# Patient Record
Sex: MALE | Race: WHITE | NOT HISPANIC OR LATINO | Employment: PART TIME | URBAN - METROPOLITAN AREA
[De-identification: names, ages, dates, MRNs, and addresses within clinical notes are randomized per-mention and may not be internally consistent; named-entity substitution may affect disease eponyms.]

---

## 2024-06-09 ENCOUNTER — HOSPITAL ENCOUNTER (EMERGENCY)
Facility: HOSPITAL | Age: 39
Discharge: HOME | End: 2024-06-10
Attending: FAMILY MEDICINE
Payer: COMMERCIAL

## 2024-06-09 DIAGNOSIS — T15.02XA FOREIGN BODY OF LEFT CORNEA, INITIAL ENCOUNTER: Primary | ICD-10-CM

## 2024-06-09 PROCEDURE — 65220 REMOVE FOREIGN BODY FROM EYE: CPT | Mod: LT

## 2024-06-09 PROCEDURE — 99284 EMERGENCY DEPT VISIT MOD MDM: CPT | Mod: 25

## 2024-06-09 PROCEDURE — 25000003 PHARM REV CODE 250: Performed by: FAMILY MEDICINE

## 2024-06-09 RX ORDER — PROPARACAINE HYDROCHLORIDE 5 MG/ML
1 SOLUTION/ DROPS OPHTHALMIC
Status: COMPLETED | OUTPATIENT
Start: 2024-06-09 | End: 2024-06-09

## 2024-06-09 RX ADMIN — PROPARACAINE HYDROCHLORIDE 1 DROP: 5 SOLUTION/ DROPS OPHTHALMIC at 11:06

## 2024-06-10 VITALS
DIASTOLIC BLOOD PRESSURE: 88 MMHG | SYSTOLIC BLOOD PRESSURE: 137 MMHG | BODY MASS INDEX: 31.48 KG/M2 | HEIGHT: 71 IN | HEART RATE: 74 BPM | OXYGEN SATURATION: 99 % | TEMPERATURE: 98 F | WEIGHT: 224.88 LBS | RESPIRATION RATE: 18 BRPM

## 2024-06-10 RX ORDER — MOXIFLOXACIN 5 MG/ML
1 SOLUTION/ DROPS OPHTHALMIC 4 TIMES DAILY
Qty: 3 ML | Refills: 0 | Status: SHIPPED | OUTPATIENT
Start: 2024-06-10 | End: 2024-06-17

## 2024-06-10 RX ORDER — ERYTHROMYCIN 5 MG/G
OINTMENT OPHTHALMIC
Qty: 3.5 G | Refills: 0 | Status: SHIPPED | OUTPATIENT
Start: 2024-06-10

## 2024-06-10 NOTE — ED PROVIDER NOTES
Encounter Date: 6/9/2024       History     Chief Complaint   Patient presents with    Foreign Body in Eye     Object in left eye secondary to using  (without eye protection) on yesterday. Was initially seen at [facility] and was told that the object is visible. Patient also states he received tetracaine drops and denies pain at this time. SholaYue ramoshannah     Patient was a 39-year-old gentleman sent over from the [facility] urgent care clinic for evaluation due to foreign body in the left eye.  Patient reports that he was using a  today, and took off his glasses just for a short period of time, and subsequently got a piece of metal in his eye.  Reports mild irritation when tetracaine wears off, but currently in no distress.  Denies any vision changes.    The history is provided by the patient.     Review of patient's allergies indicates:  No Known Allergies  History reviewed. No pertinent past medical history.  History reviewed. No pertinent surgical history.  No family history on file.     Review of Systems   Constitutional:  Negative for chills, fatigue and fever.   HENT:  Negative for ear pain, rhinorrhea and sore throat.    Eyes:  Negative for photophobia and pain.   Respiratory:  Negative for cough, shortness of breath and wheezing.    Cardiovascular:  Negative for chest pain.   Gastrointestinal:  Negative for abdominal pain, diarrhea, nausea and vomiting.   Genitourinary:  Negative for dysuria.   Neurological:  Negative for dizziness, weakness and headaches.   All other systems reviewed and are negative.      Physical Exam     Initial Vitals [06/09/24 2124]   BP Pulse Resp Temp SpO2   (!) 163/78 77 18 97.9 °F (36.6 °C) 99 %      MAP       --         Physical Exam    Nursing note and vitals reviewed.  Constitutional: He appears well-developed and well-nourished.   HENT:   Head: Normocephalic and atraumatic.   Mouth/Throat: Oropharynx is clear and moist.   Eyes: EOM are normal. Pupils are  equal, round, and reactive to light.   Patient was a small piece of metal of the 3 o'clock position, no surrounding erythema   Neck: Neck supple.   Normal range of motion.  Cardiovascular:  Normal rate, regular rhythm, normal heart sounds and intact distal pulses.     Exam reveals no gallop and no friction rub.       No murmur heard.  Pulmonary/Chest: Breath sounds normal. No respiratory distress.   Abdominal: Abdomen is soft. Bowel sounds are normal. He exhibits no distension. There is no abdominal tenderness.   Musculoskeletal:         General: Normal range of motion.      Cervical back: Normal range of motion and neck supple.     Neurological: He is alert and oriented to person, place, and time. He has normal strength.   Skin: Skin is warm and dry.   Psychiatric: He has a normal mood and affect. His behavior is normal. Judgment and thought content normal.           ED Course   Procedures  Labs Reviewed - No data to display       Imaging Results    None          Medications   proparacaine 0.5 % ophthalmic solution 1 drop (1 drop Left Eye Given 6/9/24 7579)     Medical Decision Making  39-year-old gentleman presents emergency room with a left corneal foreign body.               ED Course as of 06/10/24 0039   Sun Jun 09, 2024 2140 Discussed with Ned Burr MD with ophthalmology.  Since already coming out to see another consult, will come to evaluate the patient. [MW]   Mon Angelo 10, 2024   0038 Ophthalmology has seen evaluated the patient.  Please see consultation note.  Foreign body removed by Ophthalmology.  Will follow up in clinic outpatient.  Recommend moxifloxacin and erythromycin.  Stable for discharge home.  ER precautions given for any acute worsening. [MW]      ED Course User Index  [MW] Dakotah Green MD                           Clinical Impression:  Final diagnoses:  [T15.02XA] Foreign body of left cornea, initial encounter (Primary)          ED Disposition Condition    Discharge Stable           ED Prescriptions       Medication Sig Dispense Start Date End Date Auth. Provider    moxifloxacin (VIGAMOX) 0.5 % ophthalmic solution Place 1 drop into the left eye 4 (four) times daily. for 7 days 3 mL 6/10/2024 6/17/2024 Dakotah Green MD    erythromycin (ROMYCIN) ophthalmic ointment Place a 1/2 inch ribbon of ointment into the left lower eyelid three times per day for 7 days. 3.5 g 6/10/2024 -- Dakotah Green MD          Follow-up Information       Follow up With Specialties Details Why Contact Info    [facility] Emergency Medicine  As needed, If symptoms worsen [address] [place] [address] [phone]    Primary Care Physician  In 5 days      [facility]   [address] [place] [address] [phone]             Dakotah Green MD  06/10/24 0039

## 2024-06-10 NOTE — CONSULTS
Chief complaint/Reason for Consult: Corneal foreign body     History of Present Illness: Osiel Khalil is a 39 y.o. male who presents with with metallic foreign body after using an angle  yesterday without eye protection. He noticed a sharp pain when the metal flew into his eye, which has persisted since yesterday. No prior ocular conditions. Wore glasses/contacts prior to PRK, but no longer wears.    Past Ocular Hx: Hx of PRK in both eyes (monovision, right eye set for near) in March 2024    Current eye gtts: none      PMHx:  has no past medical history on file.     PSurgHx:  has no past surgical history on file.     Home Medications:   Prior to Admission medications    Not on File        Medications this encounter:     Allergies: has No Known Allergies.     Social Hx:       Family Hx: No family history of glaucoma. family history is not on file.     ROS: Negative x 10 except for complaints as described in HPI    Ocular examination/Dilated fundus examination:  Base Eye Exam       Visual Acuity (Snellen - Linear)         Right Left    Dist sc 20/25 20/20              Tonometry (Tonopen, 11:42 PM)         Right Left    Pressure 14 14              Pupils         Dark Light Shape React APD    Right 3 2 Round Brisk None    Left 3 2 Round Brisk None              Extraocular Movement         Right Left     Full Full              Dilation    Deferred                 Slit Lamp and Fundus Exam       External Exam         Right Left    External Normal Normal              Slit Lamp Exam         Right Left    Lids/Lashes Normal Normal    Conjunctiva/Sclera White and quiet White and quiet    Cornea Clear 1mm metallic foreign body paracentrally at 3 o'clock    Anterior Chamber Deep and quiet Deep and quiet    Iris Round and reactive Round and reactive    Lens Clear Clear    Anterior Vitreous Normal Normal                    Assessment/Plan:   1. Corneal metallic foreign body, OS  - Pt using angle  yesterday when  small metallic foreign body entered left cornea, with resulting sharp pain  - VA 20/20, Iop and pupils wnl, EOM full  - On exam, pt with small <1mm metallic foreign body paracentrally at 3o'clock left eye. Remainder of anterior segment exam is unremarkable  - Pt deferred dilated exam  - Procedure note: Risks and benefits of corneal foreign body removal were discussed with pt, including risk of scarring, injury to ocular structures, and need for additional procedures in future. Pt acknowledged these risks and opted to proceed with removal. Topical proparacaine was applied to eye followed by topical moxifloxacin. A 27G needle was used to remove the metallic foreign body. Topical moxifloxacin was then applied afterwards. Pt tolerated procedure well    Recommendations  - Use moxifloxacin drops QID in left eye for 1 week  - Use erythromycin ointment TID left eye for 1 week  - Follow-up in eye clinic after pt returns from trip (this Friday)        Ned Burr MD  [employer] Ophthalmology PGY-3